# Patient Record
Sex: MALE | Race: WHITE
[De-identification: names, ages, dates, MRNs, and addresses within clinical notes are randomized per-mention and may not be internally consistent; named-entity substitution may affect disease eponyms.]

---

## 2022-06-07 ENCOUNTER — APPOINTMENT (OUTPATIENT)
Age: 57
End: 2022-06-07
Payer: COMMERCIAL

## 2022-06-07 VITALS
SYSTOLIC BLOOD PRESSURE: 140 MMHG | WEIGHT: 225 LBS | HEIGHT: 71 IN | HEART RATE: 78 BPM | DIASTOLIC BLOOD PRESSURE: 80 MMHG | BODY MASS INDEX: 31.5 KG/M2 | OXYGEN SATURATION: 97 %

## 2022-06-07 DIAGNOSIS — Z87.891 PERSONAL HISTORY OF NICOTINE DEPENDENCE: ICD-10-CM

## 2022-06-07 DIAGNOSIS — Z86.79 PERSONAL HISTORY OF OTHER DISEASES OF THE CIRCULATORY SYSTEM: ICD-10-CM

## 2022-06-07 PROBLEM — Z00.00 ENCOUNTER FOR PREVENTIVE HEALTH EXAMINATION: Status: ACTIVE | Noted: 2022-06-07

## 2022-06-07 PROCEDURE — 71046 X-RAY EXAM CHEST 2 VIEWS: CPT

## 2022-06-07 PROCEDURE — 99203 OFFICE O/P NEW LOW 30 MIN: CPT | Mod: 25

## 2022-06-07 NOTE — PROCEDURE
[FreeTextEntry1] : Reason :tu\par Overall Findings:\par \par View Ap and lateral  \par \par \par Lung Fields:\par \par Normal Lung Markings                   No Infiltrate\par \par Pleura:\par \par No Pleural Effusions                      \par \par final Interpretation: no acute evidence of pulmonary disease\par

## 2022-06-07 NOTE — HISTORY OF PRESENT ILLNESS
[Initial Evaluation] : an initial evaluation of [Witnessed Apnea During Sleep] : witnessed apnea during sleep [Witnessed Gasping During Sleep] : witnessed gasping during sleep [Snoring] : snoring [Unrefreshing Sleep] : unrefreshing sleep [Sleepy When Sedentary] : sleepy when sedentary [Irritability] : irritability [Currently Experiencing] : The patient is currently experiencing symptoms. [None] : The patient is not currently being treated for this problem [Obesity] : obesity [Hypertension] : hypertension

## 2022-09-09 ENCOUNTER — APPOINTMENT (OUTPATIENT)
Dept: SLEEP CENTER | Facility: HOSPITAL | Age: 57
End: 2022-09-09

## 2022-09-09 ENCOUNTER — OUTPATIENT (OUTPATIENT)
Dept: OUTPATIENT SERVICES | Facility: HOSPITAL | Age: 57
LOS: 1 days | Discharge: HOME | End: 2022-09-09

## 2022-09-09 PROCEDURE — 95810 POLYSOM 6/> YRS 4/> PARAM: CPT | Mod: 26

## 2022-09-12 DIAGNOSIS — G47.33 OBSTRUCTIVE SLEEP APNEA (ADULT) (PEDIATRIC): ICD-10-CM

## 2022-09-27 ENCOUNTER — APPOINTMENT (OUTPATIENT)
Age: 57
End: 2022-09-27

## 2022-09-27 VITALS
SYSTOLIC BLOOD PRESSURE: 120 MMHG | OXYGEN SATURATION: 96 % | BODY MASS INDEX: 30.8 KG/M2 | HEIGHT: 71 IN | HEART RATE: 86 BPM | WEIGHT: 220 LBS | DIASTOLIC BLOOD PRESSURE: 70 MMHG

## 2022-09-27 DIAGNOSIS — Z12.2 ENCOUNTER FOR SCREENING FOR MALIGNANT NEOPLASM OF RESPIRATORY ORGANS: ICD-10-CM

## 2022-09-27 DIAGNOSIS — G47.33 OBSTRUCTIVE SLEEP APNEA (ADULT) (PEDIATRIC): ICD-10-CM

## 2022-09-27 PROCEDURE — 99213 OFFICE O/P EST LOW 20 MIN: CPT | Mod: 25

## 2022-09-27 PROCEDURE — G0296 VISIT TO DETERM LDCT ELIG: CPT

## 2023-01-17 ENCOUNTER — APPOINTMENT (OUTPATIENT)
Dept: PULMONOLOGY | Facility: CLINIC | Age: 58
End: 2023-01-17

## 2023-07-07 ENCOUNTER — OUTPATIENT (OUTPATIENT)
Dept: OUTPATIENT SERVICES | Facility: HOSPITAL | Age: 58
LOS: 1 days | End: 2023-07-07
Payer: COMMERCIAL

## 2023-07-07 DIAGNOSIS — R07.9 CHEST PAIN, UNSPECIFIED: ICD-10-CM

## 2023-07-07 DIAGNOSIS — Z00.8 ENCOUNTER FOR OTHER GENERAL EXAMINATION: ICD-10-CM

## 2023-07-07 PROCEDURE — 75574 CT ANGIO HRT W/3D IMAGE: CPT

## 2023-07-07 PROCEDURE — 75574 CT ANGIO HRT W/3D IMAGE: CPT | Mod: 26

## 2023-07-08 DIAGNOSIS — R07.9 CHEST PAIN, UNSPECIFIED: ICD-10-CM

## 2023-07-20 VITALS
WEIGHT: 220.02 LBS | RESPIRATION RATE: 16 BRPM | HEART RATE: 79 BPM | OXYGEN SATURATION: 100 % | HEIGHT: 71 IN | SYSTOLIC BLOOD PRESSURE: 136 MMHG | DIASTOLIC BLOOD PRESSURE: 84 MMHG

## 2023-07-20 NOTE — H&P CARDIOLOGY - COMMENTS
59 y/o M, former smoker, with PMHx of HTN, HLD, ARTURO, who presented to his cardiologist with c/o SOB, on walking mostly uphill, with subsequent abnormal CCTA on 7/7/23 which revealed an Agatston Calcium score of 36, noncalcified plaque proximal LAD resulting in severe narrowing, focal calcified and noncalcified plaque at the mid LAD resulting in moderate narrowing, as well as diffuse narrowing moderate to severe in caliber spanning approximately 4 cm within the mid LAD secondary to myocardial bridging.  Pt is now referred for cardiac cath with possible intervention if clinically indicated. 57 y/o M, former smoker, with PMHx of HTN, HLD, ARTURO, who presented to his cardiologist with c/o SOB and left arm pain with exercise, with subsequent abnormal CCTA on 7/7/23 which revealed an Agatston Calcium score of 36, noncalcified plaque proximal LAD resulting in severe narrowing, focal calcified and noncalcified plaque at the mid LAD resulting in moderate narrowing, as well as diffuse narrowing moderate to severe in caliber spanning approximately 4 cm within the mid LAD secondary to myocardial bridging.  Pt is now referred for cardiac cath with possible intervention if clinically indicated.

## 2023-07-20 NOTE — H&P CARDIOLOGY - HISTORY OF PRESENT ILLNESS
59 y/o M, former smoker, with PMHx of HTN, HLD, ARTURO, who presented to his cardiologist with c/o SOB, on walking mostly uphill, relieved with rest.  Denies any chest pain, dizziness, n/v, diaphoresis, orthopnea, PND, palpitations, LE edema, syncope.  Pt underwent a CCTA on 7/7/23 which revealed an Agatston Calcium score of 36, noncalcified plaque proximal LAD resulting in severe narrowing, focal calcified and noncalcified plaque at the mid LAD resulting in moderate narrowing, as well as diffuse narrowing moderate to severe in caliber spanning approximately 4 cm within the mid LAD secondary to myocardial bridging.  Pt is now referred for cardiac cath with possible intervention if clinically indicated.      Pre cath note:    indication:  [ ] STEMI                [ ] NSTEMI                 [ ] Acute coronary syndrome                     [ ]Unstable Angina   [ ] high risk  [ ] intermediate risk  [ ] low risk                     [x ] Stable Angina     non-invasive testing:  CCTA    Date:   7/7/23   result: [ ] high risk  [x ] intermediate risk  [ ] low risk    Anti- Anginal medications:                    [ ] not used                       [ ] used                   [ ] not used but strong indication not to use    Ejection Fraction                   [ ] <29            [ ] 30-39%   [ ] 40-49%     [ ]>50%    CHF                   [ ] active (within last 14 days on meds   [ ] Chronic (on meds but no exacerbation)    COPD                   [ ] mild (on chronic bronchodilators)  [ ] moderate (on chronic steroid therapy)      [ ] severe (indication for home O2 or PACO2 >50)    Other risk factors:                       [ ] Previous MI                     [ ] CVA/ stroke                    [ ] carotid stent/ CEA                    [ ] PVD/PAD- (arterial aneurysm, non-palpable pulses, tortuous vessel with inability to insert catheter, infra-renal dissection, renal or subclavian artery stenosis)                    [ ] diabetic                    [ ] previous CABG                    [ ] Renal Failure     Right Terrence Test:    Adjusted Cath Bleeding Risk: 0.7%    Pre-hydration:     59 y/o M, former smoker, with PMHx of HTN, HLD, ARTURO, who presented to his cardiologist with c/o SOB, on walking mostly uphill, relieved with rest.  Denies any chest pain, dizziness, n/v, diaphoresis, orthopnea, PND, palpitations, LE edema, syncope.  Pt underwent a CCTA on 7/7/23 which revealed an Agatston Calcium score of 36, noncalcified plaque proximal LAD resulting in severe narrowing, focal calcified and noncalcified plaque at the mid LAD resulting in moderate narrowing, as well as diffuse narrowing moderate to severe in caliber spanning approximately 4 cm within the mid LAD secondary to myocardial bridging.  Pt is now referred for cardiac cath with possible intervention if clinically indicated.      Pre cath note:    indication:  [ ] STEMI                [ ] NSTEMI                 [ ] Acute coronary syndrome                     [ ]Unstable Angina   [ ] high risk  [ ] intermediate risk  [ ] low risk                     [x ] Stable Angina     non-invasive testing:  CCTA    Date:   7/7/23   result: [ ] high risk  [x ] intermediate risk  [ ] low risk    Anti- Anginal medications:                    [ ] not used                       [ ] used                   [ ] not used but strong indication not to use    Ejection Fraction                   [ ] <29            [ ] 30-39%   [ ] 40-49%     [ ]>50%    CHF                   [ ] active (within last 14 days on meds   [ ] Chronic (on meds but no exacerbation)    COPD                   [ ] mild (on chronic bronchodilators)  [ ] moderate (on chronic steroid therapy)      [ ] severe (indication for home O2 or PACO2 >50)    Other risk factors:                       [ ] Previous MI                     [ ] CVA/ stroke                    [ ] carotid stent/ CEA                    [ ] PVD/PAD- (arterial aneurysm, non-palpable pulses, tortuous vessel with inability to insert catheter, infra-renal dissection, renal or subclavian artery stenosis)                    [ ] diabetic                    [ ] previous CABG                    [ ] Renal Failure     Right Terrence Test:    Adjusted Cath Bleeding Risk: 0.7%    Pre-hydration: 250cc NS bolus     59 y/o M, former smoker, with PMHx of HTN, HLD, ARTURO (not on CPAP), who presented to his cardiologist with c/o SOB and Left arm pain with exercise, on jogging or speed walking within the last 6 mos, relieved with rest.  Denies any dizziness, n/v, diaphoresis, orthopnea, PND, palpitations, LE edema, syncope.  Pt underwent a CCTA on 7/7/23 which revealed an Agatston Calcium score of 36, noncalcified plaque proximal LAD resulting in severe narrowing, focal calcified and noncalcified plaque at the mid LAD resulting in moderate narrowing, as well as diffuse narrowing moderate to severe in caliber spanning approximately 4 cm within the mid LAD secondary to myocardial bridging.  Pt is now referred for cardiac cath with possible intervention if clinically indicated.      Pre cath note:    indication:  [ ] STEMI                [ ] NSTEMI                 [ ] Acute coronary syndrome                     [ ]Unstable Angina   [ ] high risk  [ ] intermediate risk  [ ] low risk                     [x ] Stable Angina     non-invasive testing:  CCTA    Date:   7/7/23   result: [ ] high risk  [x ] intermediate risk  [ ] low risk    Anti- Anginal medications:                    [ ] not used                       [x ] used                   [ ] not used but strong indication not to use  -on CCB    Ejection Fraction                   [ ] <29            [ ] 30-39%   [ ] 40-49%     [ ]>50%    CHF                   [ ] active (within last 14 days on meds   [ ] Chronic (on meds but no exacerbation)    COPD                   [ ] mild (on chronic bronchodilators)  [ ] moderate (on chronic steroid therapy)      [ ] severe (indication for home O2 or PACO2 >50)    Other risk factors:                       [ ] Previous MI                     [ ] CVA/ stroke                    [ ] carotid stent/ CEA                    [ ] PVD/PAD- (arterial aneurysm, non-palpable pulses, tortuous vessel with inability to insert catheter, infra-renal dissection, renal or subclavian artery stenosis)                    [ ] diabetic                    [ ] previous CABG                    [ ] Renal Failure     Right Terrence Test: positive    Adjusted Cath Bleeding Risk: 0.7%    Pre-hydration: 250cc NS bolus

## 2023-07-20 NOTE — H&P CARDIOLOGY - NSICDXPASTMEDICALHX_GEN_ALL_CORE_FT
PAST MEDICAL HISTORY:  HLD (hyperlipidemia)     HTN (hypertension)     ARTURO (obstructive sleep apnea)

## 2023-07-21 ENCOUNTER — INPATIENT (INPATIENT)
Facility: HOSPITAL | Age: 58
LOS: 0 days | Discharge: HOME | DRG: 247 | End: 2023-07-22
Attending: INTERNAL MEDICINE | Admitting: INTERNAL MEDICINE
Payer: COMMERCIAL

## 2023-07-21 DIAGNOSIS — R06.02 SHORTNESS OF BREATH: ICD-10-CM

## 2023-07-21 LAB
ANION GAP SERPL CALC-SCNC: 9 MMOL/L — SIGNIFICANT CHANGE UP (ref 7–14)
BUN SERPL-MCNC: 14 MG/DL — SIGNIFICANT CHANGE UP (ref 10–20)
CALCIUM SERPL-MCNC: 9.7 MG/DL — SIGNIFICANT CHANGE UP (ref 8.4–10.5)
CHLORIDE SERPL-SCNC: 100 MMOL/L — SIGNIFICANT CHANGE UP (ref 98–110)
CO2 SERPL-SCNC: 30 MMOL/L — SIGNIFICANT CHANGE UP (ref 17–32)
CREAT SERPL-MCNC: 0.9 MG/DL — SIGNIFICANT CHANGE UP (ref 0.7–1.5)
EGFR: 99 ML/MIN/1.73M2 — SIGNIFICANT CHANGE UP
GLUCOSE SERPL-MCNC: 93 MG/DL — SIGNIFICANT CHANGE UP (ref 70–99)
HCT VFR BLD CALC: 43 % — SIGNIFICANT CHANGE UP (ref 42–52)
HGB BLD-MCNC: 15 G/DL — SIGNIFICANT CHANGE UP (ref 14–18)
MCHC RBC-ENTMCNC: 30.1 PG — SIGNIFICANT CHANGE UP (ref 27–31)
MCHC RBC-ENTMCNC: 34.9 G/DL — SIGNIFICANT CHANGE UP (ref 32–37)
MCV RBC AUTO: 86.3 FL — SIGNIFICANT CHANGE UP (ref 80–94)
NRBC # BLD: 0 /100 WBCS — SIGNIFICANT CHANGE UP (ref 0–0)
PLATELET # BLD AUTO: 318 K/UL — SIGNIFICANT CHANGE UP (ref 130–400)
PMV BLD: 10 FL — SIGNIFICANT CHANGE UP (ref 7.4–10.4)
POTASSIUM SERPL-MCNC: 4.2 MMOL/L — SIGNIFICANT CHANGE UP (ref 3.5–5)
POTASSIUM SERPL-SCNC: 4.2 MMOL/L — SIGNIFICANT CHANGE UP (ref 3.5–5)
RBC # BLD: 4.98 M/UL — SIGNIFICANT CHANGE UP (ref 4.7–6.1)
RBC # FLD: 13.3 % — SIGNIFICANT CHANGE UP (ref 11.5–14.5)
SODIUM SERPL-SCNC: 139 MMOL/L — SIGNIFICANT CHANGE UP (ref 135–146)
WBC # BLD: 6.71 K/UL — SIGNIFICANT CHANGE UP (ref 4.8–10.8)
WBC # FLD AUTO: 6.71 K/UL — SIGNIFICANT CHANGE UP (ref 4.8–10.8)

## 2023-07-21 PROCEDURE — 93458 L HRT ARTERY/VENTRICLE ANGIO: CPT | Mod: 59

## 2023-07-21 PROCEDURE — 93005 ELECTROCARDIOGRAM TRACING: CPT

## 2023-07-21 PROCEDURE — 92928 PRQ TCAT PLMT NTRAC ST 1 LES: CPT | Mod: LD

## 2023-07-21 PROCEDURE — 93010 ELECTROCARDIOGRAM REPORT: CPT

## 2023-07-21 PROCEDURE — 85027 COMPLETE CBC AUTOMATED: CPT

## 2023-07-21 PROCEDURE — 80048 BASIC METABOLIC PNL TOTAL CA: CPT

## 2023-07-21 PROCEDURE — 36415 COLL VENOUS BLD VENIPUNCTURE: CPT

## 2023-07-21 PROCEDURE — 93799 UNLISTED CV SVC/PROCEDURE: CPT

## 2023-07-21 RX ORDER — LOSARTAN POTASSIUM 100 MG/1
100 TABLET, FILM COATED ORAL DAILY
Refills: 0 | Status: DISCONTINUED | OUTPATIENT
Start: 2023-07-22 | End: 2023-07-22

## 2023-07-21 RX ORDER — LOSARTAN POTASSIUM 100 MG/1
1 TABLET, FILM COATED ORAL
Refills: 0 | DISCHARGE

## 2023-07-21 RX ORDER — METOPROLOL TARTRATE 50 MG
0.5 TABLET ORAL
Qty: 30 | Refills: 3
Start: 2023-07-21 | End: 2023-11-17

## 2023-07-21 RX ORDER — ROSUVASTATIN CALCIUM 5 MG/1
1 TABLET ORAL
Qty: 30 | Refills: 3
Start: 2023-07-21 | End: 2023-11-17

## 2023-07-21 RX ORDER — NIFEDIPINE 30 MG
1 TABLET, EXTENDED RELEASE 24 HR ORAL
Refills: 0 | DISCHARGE

## 2023-07-21 RX ORDER — METOPROLOL TARTRATE 50 MG
12.5 TABLET ORAL
Refills: 0 | Status: DISCONTINUED | OUTPATIENT
Start: 2023-07-21 | End: 2023-07-22

## 2023-07-21 RX ORDER — ATORVASTATIN CALCIUM 80 MG/1
40 TABLET, FILM COATED ORAL AT BEDTIME
Refills: 0 | Status: DISCONTINUED | OUTPATIENT
Start: 2023-07-21 | End: 2023-07-22

## 2023-07-21 RX ORDER — HYDROCHLOROTHIAZIDE 25 MG
1 TABLET ORAL
Refills: 0 | DISCHARGE

## 2023-07-21 RX ORDER — ASPIRIN/CALCIUM CARB/MAGNESIUM 324 MG
1 TABLET ORAL
Refills: 0 | DISCHARGE

## 2023-07-21 RX ORDER — TICAGRELOR 90 MG/1
1 TABLET ORAL
Qty: 60 | Refills: 3
Start: 2023-07-21 | End: 2023-11-17

## 2023-07-21 RX ORDER — ASPIRIN/CALCIUM CARB/MAGNESIUM 324 MG
81 TABLET ORAL DAILY
Refills: 0 | Status: DISCONTINUED | OUTPATIENT
Start: 2023-07-22 | End: 2023-07-22

## 2023-07-21 RX ORDER — TICAGRELOR 90 MG/1
90 TABLET ORAL
Refills: 0 | Status: DISCONTINUED | OUTPATIENT
Start: 2023-07-21 | End: 2023-07-22

## 2023-07-21 RX ORDER — SODIUM CHLORIDE 9 MG/ML
1500 INJECTION INTRAMUSCULAR; INTRAVENOUS; SUBCUTANEOUS
Refills: 0 | Status: DISCONTINUED | OUTPATIENT
Start: 2023-07-21 | End: 2023-07-22

## 2023-07-21 RX ORDER — NIFEDIPINE 30 MG
90 TABLET, EXTENDED RELEASE 24 HR ORAL DAILY
Refills: 0 | Status: DISCONTINUED | OUTPATIENT
Start: 2023-07-22 | End: 2023-07-22

## 2023-07-21 RX ORDER — ASPIRIN/CALCIUM CARB/MAGNESIUM 324 MG
1 TABLET ORAL
Qty: 30 | Refills: 3
Start: 2023-07-21 | End: 2023-11-17

## 2023-07-21 RX ADMIN — ATORVASTATIN CALCIUM 40 MILLIGRAM(S): 80 TABLET, FILM COATED ORAL at 21:45

## 2023-07-21 RX ADMIN — TICAGRELOR 90 MILLIGRAM(S): 90 TABLET ORAL at 17:28

## 2023-07-21 RX ADMIN — Medication 12.5 MILLIGRAM(S): at 17:28

## 2023-07-21 RX ADMIN — SODIUM CHLORIDE 150 MILLILITER(S): 9 INJECTION INTRAMUSCULAR; INTRAVENOUS; SUBCUTANEOUS at 14:21

## 2023-07-21 NOTE — PATIENT PROFILE ADULT - FALL HARM RISK - HARM RISK INTERVENTIONS
Communicate Risk of Fall with Harm to all staff/Reinforce activity limits and safety measures with patient and family/Tailored Fall Risk Interventions/Use of alarms - bed, chair and/or voice tab/Visual Cue: Yellow wristband and red socks/Bed in lowest position, wheels locked, appropriate side rails in place/Call bell, personal items and telephone in reach/Instruct patient to call for assistance before getting out of bed or chair/Non-slip footwear when patient is out of bed/[place] to call system/Physically safe environment - no spills, clutter or unnecessary equipment/Purposeful Proactive Rounding/Room/bathroom lighting operational, light cord in reach

## 2023-07-21 NOTE — CHART NOTE - NSCHARTNOTEFT_GEN_A_CORE
patient pharmacy called in for Brilinta 90 mg PO q12hr x1 month x 3 refills co-pay is $5 per month, patient is agreeing for it, medication is ready for a    Aspirin 325 mg PO Daily D/C at patient pharmacy and Aspirin 81 mg PO Daily send  Crestor 20 mg PO qHS and Metoprolol 12.5 mg PO q12hr ordered. Patient educated on medication changes.

## 2023-07-21 NOTE — CHART NOTE - NSCHARTNOTEFT_GEN_A_CORE
PRE-OP DIAGNOSIS:    Positive CCTA  New Onset Angina     PROCEDURE:     [x] Coronary Angiogram   [x] LHC   [x] Intervention (see below)         PHYSICIAN:      ASSISTANT:  Dr. Bernard        PROCEDURE DESCRIPTION:     Consent:      [x] Patient     [] Family Member     []  Used        Anesthesia:     [] General     [x] Sedation   [x] Local        Access & Closure:     [x]6 Fr right Radial Artery > D-stat          IV Contrast: 180 mL        Intervention:   IFR of the proximal LAD 0.85; Hemodynamically significant   Successful PCI with JOHN to proximal LAD  AUC score of 7    Implants:    RELL FRONTIER RX [id number]     FINDINGS:     Coronary Dominance:   Right    LM:   no disease     LAD:   proximal LAD, 70% lesion IFR positive s/p PCI with JOHN x1  mid LAD mild disease   distal LAD mild disease     CX:   mild disease     RCA:   minimal luminal irregularities      LVEDP: 17 mmHg     EF: 60 %        ESTIMATED BLOOD LOSS: < 10 mL        CONDITION:     [x] Good     [] Fair     [] Critical        SPECIMEN REMOVED: N/A       POST-OP DIAGNOSIS:    One Vessel Coronary Artery Disease   Successful PCI with JOHN to proximal LAD ( RELL FRONTIER RX [id number] )       PLAN OF CARE:   [x] Admit for observation overnight   [x] Medications:  DAPT with aspirin/ Ticagrelor, statin and cont with home BP medications losartan and nifedipine    [x] IV Fluids as ordered

## 2023-07-22 ENCOUNTER — TRANSCRIPTION ENCOUNTER (OUTPATIENT)
Age: 58
End: 2023-07-22

## 2023-07-22 VITALS — HEART RATE: 65 BPM | DIASTOLIC BLOOD PRESSURE: 75 MMHG | SYSTOLIC BLOOD PRESSURE: 138 MMHG

## 2023-07-22 LAB
ANION GAP SERPL CALC-SCNC: 9 MMOL/L — SIGNIFICANT CHANGE UP (ref 7–14)
BUN SERPL-MCNC: 12 MG/DL — SIGNIFICANT CHANGE UP (ref 10–20)
CALCIUM SERPL-MCNC: 9.1 MG/DL — SIGNIFICANT CHANGE UP (ref 8.4–10.5)
CHLORIDE SERPL-SCNC: 107 MMOL/L — SIGNIFICANT CHANGE UP (ref 98–110)
CO2 SERPL-SCNC: 26 MMOL/L — SIGNIFICANT CHANGE UP (ref 17–32)
CREAT SERPL-MCNC: 0.7 MG/DL — SIGNIFICANT CHANGE UP (ref 0.7–1.5)
EGFR: 107 ML/MIN/1.73M2 — SIGNIFICANT CHANGE UP
GLUCOSE SERPL-MCNC: 106 MG/DL — HIGH (ref 70–99)
HCT VFR BLD CALC: 41 % — LOW (ref 42–52)
HGB BLD-MCNC: 14.1 G/DL — SIGNIFICANT CHANGE UP (ref 14–18)
MCHC RBC-ENTMCNC: 29.8 PG — SIGNIFICANT CHANGE UP (ref 27–31)
MCHC RBC-ENTMCNC: 34.4 G/DL — SIGNIFICANT CHANGE UP (ref 32–37)
MCV RBC AUTO: 86.7 FL — SIGNIFICANT CHANGE UP (ref 80–94)
NRBC # BLD: 0 /100 WBCS — SIGNIFICANT CHANGE UP (ref 0–0)
PLATELET # BLD AUTO: 288 K/UL — SIGNIFICANT CHANGE UP (ref 130–400)
PMV BLD: 10 FL — SIGNIFICANT CHANGE UP (ref 7.4–10.4)
POTASSIUM SERPL-MCNC: 4.2 MMOL/L — SIGNIFICANT CHANGE UP (ref 3.5–5)
POTASSIUM SERPL-SCNC: 4.2 MMOL/L — SIGNIFICANT CHANGE UP (ref 3.5–5)
RBC # BLD: 4.73 M/UL — SIGNIFICANT CHANGE UP (ref 4.7–6.1)
RBC # FLD: 13.2 % — SIGNIFICANT CHANGE UP (ref 11.5–14.5)
SODIUM SERPL-SCNC: 142 MMOL/L — SIGNIFICANT CHANGE UP (ref 135–146)
WBC # BLD: 7.35 K/UL — SIGNIFICANT CHANGE UP (ref 4.8–10.8)
WBC # FLD AUTO: 7.35 K/UL — SIGNIFICANT CHANGE UP (ref 4.8–10.8)

## 2023-07-22 PROCEDURE — 93010 ELECTROCARDIOGRAM REPORT: CPT

## 2023-07-22 RX ORDER — PANTOPRAZOLE SODIUM 20 MG/1
1 TABLET, DELAYED RELEASE ORAL
Qty: 30 | Refills: 0
Start: 2023-07-22 | End: 2023-08-20

## 2023-07-22 RX ADMIN — LOSARTAN POTASSIUM 100 MILLIGRAM(S): 100 TABLET, FILM COATED ORAL at 05:48

## 2023-07-22 RX ADMIN — Medication 90 MILLIGRAM(S): at 05:47

## 2023-07-22 RX ADMIN — Medication 12.5 MILLIGRAM(S): at 05:47

## 2023-07-22 RX ADMIN — TICAGRELOR 90 MILLIGRAM(S): 90 TABLET ORAL at 05:47

## 2023-07-22 NOTE — DISCHARGE NOTE PROVIDER - NSDCMRMEDTOKEN_GEN_ALL_CORE_FT
aspirin 81 mg oral delayed release tablet: 1 tab(s) orally once a day MDD: 1  Brilinta (ticagrelor) 90 mg oral tablet: 1 tab(s) orally 2 times a day MDD: 2  Crestor 20 mg oral tablet: 1 tab(s) orally once a day (at bedtime) MDD: 1  hydroCHLOROthiazide 25 mg oral tablet: 1 orally once a day  losartan 100 mg oral tablet: 1 orally once a day  metoprolol tartrate 25 mg oral tablet: 0.5 tab(s) orally 2 times a day MDD: 1  NIFEdipine (Eqv-Procardia XL) 90 mg oral tablet, extended release: 1 orally once a day  pantoprazole 40 mg oral delayed release tablet: 1 tab(s) orally once a day MDD: 1

## 2023-07-22 NOTE — DISCHARGE NOTE PROVIDER - CARE PROVIDER_API CALL
Delia Snyder  Interventional Cardiology  [address] [place] [address]  Phone: [phone]  Fax: [phone]  Follow Up Time:

## 2023-07-22 NOTE — DISCHARGE NOTE PROVIDER - NSDCCPTREATMENT_GEN_ALL_CORE_FT
PRINCIPAL PROCEDURE  Procedure: Percutaneous coronary angioplasty  Findings and Treatment: s/p PCI JOHN x 1 to pLAD

## 2023-07-22 NOTE — DISCHARGE NOTE NURSING/CASE MANAGEMENT/SOCIAL WORK - PATIENT PORTAL LINK FT
You can access the FollowMyHealth Patient Portal offered by [facility] by registering at the following website: [email or web address]. By joining [facility]’s FollowMyHealth portal, you will also be able to view your health information using other applications (apps) compatible with our system.

## 2023-07-22 NOTE — DISCHARGE NOTE PROVIDER - NSDCCPCAREPLAN_GEN_ALL_CORE_FT
PRINCIPAL DISCHARGE DIAGNOSIS  Diagnosis: CAD (coronary artery disease)  Assessment and Plan of Treatment: continue medications as prescribed

## 2023-07-22 NOTE — DISCHARGE NOTE NURSING/CASE MANAGEMENT/SOCIAL WORK - NSDCPEFALRISK_GEN_ALL_CORE
For information on Fall & Injury Prevention, visit: [email or web address] OR  [email or web address] OR  https://www.cdc.gov/steadi/patient.html

## 2023-07-22 NOTE — DISCHARGE NOTE PROVIDER - NSDCFUADDINST_GEN_ALL_CORE_FT
Medications:  - DO NOT stop your dual antiplatelet medication (ie: Brilinta/ticagrelor, Aspirin), unless directed by your Cardiologist  -Continue taking your HCTZ, Nifedipine XL, Metoprolol Tartrate, Crestor, Losartan, Protonix   - If you are diabetic and taking medication containing Metformin, do not take them for 48 hours after the procedure.   - Soreness or tenderness at the site is possible, it will diminish over time. You may take Tylenol every 4-6 hours as needed. Nothing stronger should be required.    Activity:  - Do not drive or operate heavy machinery for 24 hours.  - For groin access, support the groin site with your hand when you  sneeze or cough. No heavy lifting (objects more than 10 pounds) x 1 week.  - For wrist access, avoid using affected arm for 24 hours after removal of dressing and avoid heavy lifting for 7 days.    Hygiene:  - After 24 hours, you may shower and remove the dressing from the site.   - Do not tub bathe for one week.   - Do not rub or apply lotion, cream, powder to the affected site. Leave it open to air.     Diet:   - You may resume your regular diet.   - Drink extra fluid unless otherwise advised.     Special Instructions:  - Bruising or black and blue at the puncture site is possible.  - If there is bleeding from the puncture site (groin or wrist) apply direct, firm pressure on the site and call 911.  - Any sudden swelling, redness, fever, discharge or severe pain, call your physician or call the cath lab.  - If you notice any scab formation in the area avoid touching the site and allow it to heal.  - If Numbness or "pins and needle" sensation occurs in the affected arm, hand, leg; or if the affected site becomes cool to touch or pale that persists for an extended period of time, call your physician immediately to be checked.   - If you developed chest pain, not relieved by your usual routine medication, fainting, lethargy, weakness, report to the nearest emergency room.   - Inform your Dentist or Surgeon if you are taking Aspirin or any antiplatelet medications. Report any bleeding in your urine or stool.     Follow-up:  Please follow up with your Cardiologist in 2 weeks after discharge. Please call and make an appointment.

## 2023-07-22 NOTE — DISCHARGE NOTE PROVIDER - HOSPITAL COURSE
57 y/o M, former smoker, with PMHx of HTN, HLD, ARTURO (not on CPAP), who presented to his cardiologist with c/o SOB and Left arm pain with exercise, on jogging or speed walking within the last 6 mos, relieved with rest.  Denies any dizziness, n/v, diaphoresis, orthopnea, PND, palpitations, LE edema, syncope.  Pt underwent a CCTA on 7/7/23 which revealed an Agatston Calcium score of 36, noncalcified plaque proximal LAD resulting in severe narrowing, focal calcified and noncalcified plaque at the mid LAD resulting in moderate narrowing, as well as diffuse narrowing moderate to severe in caliber spanning approximately 4 cm within the mid LAD secondary to myocardial bridging.    s/p PCI 7/21/23    Intervention:   IFR of the proximal LAD 0.85; Hemodynamically significant   Successful PCI with JOHN to proximal LAD  AUC score of 7    Implants:    RELL FRONTIER RX [id number]     Monitored overnight on telemetry, post cardiac cath care rendered, IV hydration given, no events.  Stable for discharge as discussed with Dr. Snyder

## 2023-07-22 NOTE — PROGRESS NOTE ADULT - SUBJECTIVE AND OBJECTIVE BOX
Cardiology Follow up s/p PCI JOHN  to LAD     HAZEL MORRIS   58y Male  PAST MEDICAL & SURGICAL HISTORY:  HTN (hypertension)  HLD (hyperlipidemia)  ARTURO (obstructive sleep apnea)  No significant past surgical history      HPI:    57 y/o M, former smoker, with PMHx of HTN, HLD, ARTURO (not on CPAP), who presented to his cardiologist with c/o SOB and Left arm pain with exercise, on jogging or speed walking within the last 6 mos, relieved with rest.  Denies any dizziness, n/v, diaphoresis, orthopnea, PND, palpitations, LE edema, syncope.  Pt underwent a CCTA on 7/7/23 which revealed an Agatston Calcium score of 36, noncalcified plaque proximal LAD resulting in severe narrowing, focal calcified and noncalcified plaque at the mid LAD resulting in moderate narrowing, as well as diffuse narrowing moderate to severe in caliber spanning approximately 4 cm within the mid LAD secondary to myocardial bridging.  Pt is now referred for cardiac cath with possible intervention if clinically indicated.      Pre cath note:    indication:  [ ] STEMI                [ ] NSTEMI                 [ ] Acute coronary syndrome                     [ ]Unstable Angina   [ ] high risk  [ ] intermediate risk  [ ] low risk                     [x ] Stable Angina     non-invasive testing:  CCTA    Date:   7/7/23   result: [ ] high risk  [x ] intermediate risk  [ ] low risk    Anti- Anginal medications:                    [ ] not used                       [x ] used                   [ ] not used but strong indication not to use  -on CCB    Ejection Fraction                   [ ] <29            [ ] 30-39%   [ ] 40-49%     [ ]>50%    CHF                   [ ] active (within last 14 days on meds   [ ] Chronic (on meds but no exacerbation)    COPD                   [ ] mild (on chronic bronchodilators)  [ ] moderate (on chronic steroid therapy)      [ ] severe (indication for home O2 or PACO2 >50)    Other risk factors:                       [ ] Previous MI                     [ ] CVA/ stroke                    [ ] carotid stent/ CEA                    [ ] PVD/PAD- (arterial aneurysm, non-palpable pulses, tortuous vessel with inability to insert catheter, infra-renal dissection, renal or subclavian artery stenosis)                    [ ] diabetic                    [ ] previous CABG                    [ ] Renal Failure     Right Terrence Test: positive    Adjusted Cath Bleeding Risk: 0.7%    Pre-hydration: 250cc NS bolus   (20 Jul 2023 15:34)    Allergies    No Known Allergies    Intolerances      Patient seen and examined at bedside. No acute events overnight.  Patient without complaints. Pt ambulated without issues/symptoms  Denies CP, SOB, palpitations, or dizziness  No events on telemetry overnight    Vital Signs Last 24 Hrs  T(C): 35.7 (21 Jul 2023 16:57), Max: 35.7 (21 Jul 2023 16:57)  T(F): 96.2 (21 Jul 2023 16:57), Max: 96.2 (21 Jul 2023 16:57)  HR: 65 (22 Jul 2023 04:55) (65 - 72)  BP: 138/75 (22 Jul 2023 04:55) (137/72 - 138/75)  BP(mean): --  RR: 18 (21 Jul 2023 16:57) (18 - 18)  SpO2: --        MEDICATIONS  (STANDING):  aspirin enteric coated 81 milliGRAM(s) Oral daily  atorvastatin 40 milliGRAM(s) Oral at bedtime  hydrochlorothiazide 25 milliGRAM(s) Oral daily  losartan 100 milliGRAM(s) Oral daily  metoprolol tartrate 12.5 milliGRAM(s) Oral two times a day  NIFEdipine XL 90 milliGRAM(s) Oral daily  sodium chloride 0.9%. 1500 milliLiter(s) (150 mL/Hr) IV Continuous <Continuous>  ticagrelor 90 milliGRAM(s) Oral two times a day    MEDICATIONS  (PRN):      REVIEW OF SYSTEMS:          All negative except as mentioned in HPI    PHYSICAL EXAM:           CONSTITUTIONAL: Well-developed; well-nourished; in no acute distress  	SKIN: warm, dry  	HEAD: Normocephalic; atraumatic  	EYES: PERRL.  	ENT: No nasal discharge, airway clear, mucous membranes moist  	NECK: Supple; non tender.  	CARD: +S1, +S2, no murmurs, gallops, or rubs. Regular rate and rhythm    	RESP: No wheezes, rales or rhonchi. CTA B/L  	ABD: soft ntnd, + BS x 4 quadrants  	EXT: moves all extremities,  no clubbing, cyanosis or edema  	NEURO: Alert and oriented x3, no focal deficits          PSYCH: Cooperative, appropriate          VASCULAR:  + Rad / + PTs / +  DPs          EXTREMITY:      	   Right Radial: pressure dressing removed, access site soft, no hematoma, no pain, + pulses, no sign of infection, no numbness            ECG: pending this am                                                                                                                 LABS:                        14.1   7.35  )-----------( 288      ( 22 Jul 2023 07:04 )             41.0     07-22    142  |  107  |  12  ----------------------------<  106<H>  4.2   |  26  |  0.7    Ca    9.1      22 Jul 2023 07:04        A/P:  I discussed the case with Cardiologist Dr. Snyder and recommend the following:    S/P PCI:     [x]6 Fr right Radial Artery > D-stat    IV Contrast: 180 mL      Intervention:   IFR of the proximal LAD 0.85; Hemodynamically significant   Successful PCI with JOHN to proximal LAD  AUC score of 7    Implants:    RELL FRONTIER RX [id number]     FINDINGS:     Coronary Dominance:   Right    LM:   no disease     LAD:   proximal LAD, 70% lesion IFR positive s/p PCI with JOHN x1  mid LAD mild disease   distal LAD mild disease     CX:   mild disease     RCA:   minimal luminal irregularities      LVEDP: 17 mmHg     EF: 60 %                              Ambulate around unit as tolerated  	      Continue DAPT ( Aspirin 81 mg PO Daily and Brilinta 90mg twice daily ), ARB, B-Blocker, Statin Therapy, nifedipine XL, PPI                   Patient pharmacy called in for Brilinta / Effient prescription and it is  $5 per month...patient agreeable with price, available at pharmacy to be picked up                   Patient agreeing to take DAPT for at least one year or as directed by cardiologist                    Pt given instructions on importance of taking antiplatelet medication or risk acute stent thrombosis/death                   Post cath instructions, access site care and activity restrictions reviewed with patient                     Discussed with patient to return to hospital if experience chest pain, shortness breath, dizziness and site bleeding                   Aggressive risk factor modification, diet counseling, smoking cessation discussed with patient                       Can discharge patient from cardiac standpoint after ambulating without symptoms and access site wnl, ECG and blood work reviewed                    Benefits of Cardiac Rehab discussed with patient, All documents [facility]. Patient instructed to call and make first                               appointment after first f/u visit with Cardiologist                    Follow up with Cardiology Dr. Snyder  in  two weeks.  Instructed to call and make an appointment                      Discharge instructions as follows, when ready to d/c:                   - Continue medical regimen as prescribed to prevent chest pain                   - Continue dual anti-platelet therapy, beta blocker, statin                   - If you are diabetic and taking medication containing Metformin, do not take them for 48 hours after the procedure                   - Instructed to call 911 if chest pain, shortness of breath or bleeding from access site                   - No heavy lifting >10lbs x 1 week                   - No driving x 24 hours                   - No baths, swimming pools x 1 week, may shower                   - Low sodium low fat low cholesterol diet                   - Follow-up with Cardiologist in 1-2 weeks after discharge

## 2023-07-31 DIAGNOSIS — Z87.891 PERSONAL HISTORY OF NICOTINE DEPENDENCE: ICD-10-CM

## 2023-07-31 DIAGNOSIS — Q24.5 MALFORMATION OF CORONARY VESSELS: ICD-10-CM

## 2023-07-31 DIAGNOSIS — E78.5 HYPERLIPIDEMIA, UNSPECIFIED: ICD-10-CM

## 2023-07-31 DIAGNOSIS — Z79.82 LONG TERM (CURRENT) USE OF ASPIRIN: ICD-10-CM

## 2023-07-31 DIAGNOSIS — Z91.199 PATIENT'S NONCOMPLIANCE WITH OTHER MEDICAL TREATMENT AND REGIMEN DUE TO UNSPECIFIED REASON: ICD-10-CM

## 2023-07-31 DIAGNOSIS — I25.118 ATHEROSCLEROTIC HEART DISEASE OF NATIVE CORONARY ARTERY WITH OTHER FORMS OF ANGINA PECTORIS: ICD-10-CM

## 2023-07-31 DIAGNOSIS — G47.33 OBSTRUCTIVE SLEEP APNEA (ADULT) (PEDIATRIC): ICD-10-CM

## 2023-07-31 DIAGNOSIS — I10 ESSENTIAL (PRIMARY) HYPERTENSION: ICD-10-CM

## 2023-07-31 DIAGNOSIS — I25.84 CORONARY ATHEROSCLEROSIS DUE TO CALCIFIED CORONARY LESION: ICD-10-CM

## 2023-08-01 PROBLEM — G47.33 OBSTRUCTIVE SLEEP APNEA (ADULT) (PEDIATRIC): Chronic | Status: ACTIVE | Noted: 2023-07-20

## 2023-08-01 PROBLEM — E78.5 HYPERLIPIDEMIA, UNSPECIFIED: Chronic | Status: ACTIVE | Noted: 2023-07-20

## 2023-08-01 PROBLEM — I10 ESSENTIAL (PRIMARY) HYPERTENSION: Chronic | Status: ACTIVE | Noted: 2023-07-20

## 2023-08-14 ENCOUNTER — OUTPATIENT (OUTPATIENT)
Dept: OUTPATIENT SERVICES | Facility: HOSPITAL | Age: 58
LOS: 1 days | End: 2023-08-14
Payer: COMMERCIAL

## 2023-08-14 DIAGNOSIS — R10.9 UNSPECIFIED ABDOMINAL PAIN: ICD-10-CM

## 2023-08-14 DIAGNOSIS — Z00.8 ENCOUNTER FOR OTHER GENERAL EXAMINATION: ICD-10-CM

## 2023-08-14 PROCEDURE — 76700 US EXAM ABDOM COMPLETE: CPT

## 2023-08-14 PROCEDURE — 76700 US EXAM ABDOM COMPLETE: CPT | Mod: 26

## 2023-08-15 DIAGNOSIS — R10.9 UNSPECIFIED ABDOMINAL PAIN: ICD-10-CM

## 2024-03-07 ENCOUNTER — OUTPATIENT (OUTPATIENT)
Dept: OUTPATIENT SERVICES | Facility: HOSPITAL | Age: 59
LOS: 1 days | End: 2024-03-07
Payer: COMMERCIAL

## 2024-03-07 DIAGNOSIS — M54.59 OTHER LOW BACK PAIN: ICD-10-CM

## 2024-03-07 DIAGNOSIS — M79.622 PAIN IN LEFT UPPER ARM: ICD-10-CM

## 2024-03-07 PROCEDURE — 73080 X-RAY EXAM OF ELBOW: CPT | Mod: 50

## 2024-03-07 PROCEDURE — 73080 X-RAY EXAM OF ELBOW: CPT | Mod: 26,50

## 2024-03-07 PROCEDURE — 73060 X-RAY EXAM OF HUMERUS: CPT | Mod: 26

## 2024-03-07 PROCEDURE — 73090 X-RAY EXAM OF FOREARM: CPT | Mod: 26,50

## 2024-03-07 PROCEDURE — 73060 X-RAY EXAM OF HUMERUS: CPT | Mod: 50

## 2024-03-07 PROCEDURE — 72110 X-RAY EXAM L-2 SPINE 4/>VWS: CPT

## 2024-03-07 PROCEDURE — 73090 X-RAY EXAM OF FOREARM: CPT | Mod: 50

## 2024-03-07 PROCEDURE — 72110 X-RAY EXAM L-2 SPINE 4/>VWS: CPT | Mod: 26

## 2024-03-08 DIAGNOSIS — M54.59 OTHER LOW BACK PAIN: ICD-10-CM

## 2024-03-08 DIAGNOSIS — M79.622 PAIN IN LEFT UPPER ARM: ICD-10-CM

## 2024-05-17 ENCOUNTER — OUTPATIENT (OUTPATIENT)
Dept: OUTPATIENT SERVICES | Facility: HOSPITAL | Age: 59
LOS: 1 days | End: 2024-05-17
Payer: COMMERCIAL

## 2024-05-17 DIAGNOSIS — M54.2 CERVICALGIA: ICD-10-CM

## 2024-05-17 PROCEDURE — 72050 X-RAY EXAM NECK SPINE 4/5VWS: CPT

## 2024-05-17 PROCEDURE — 72050 X-RAY EXAM NECK SPINE 4/5VWS: CPT | Mod: 26

## 2024-05-18 DIAGNOSIS — M54.2 CERVICALGIA: ICD-10-CM
